# Patient Record
Sex: FEMALE | Race: WHITE | URBAN - METROPOLITAN AREA
[De-identification: names, ages, dates, MRNs, and addresses within clinical notes are randomized per-mention and may not be internally consistent; named-entity substitution may affect disease eponyms.]

---

## 2022-10-17 ENCOUNTER — APPOINTMENT (OUTPATIENT)
Dept: URBAN - METROPOLITAN AREA CLINIC 232 | Age: 77
Setting detail: DERMATOLOGY
End: 2022-10-18

## 2022-10-17 DIAGNOSIS — D18.0 HEMANGIOMA: ICD-10-CM

## 2022-10-17 DIAGNOSIS — L82.0 INFLAMED SEBORRHEIC KERATOSIS: ICD-10-CM

## 2022-10-17 DIAGNOSIS — L82.1 OTHER SEBORRHEIC KERATOSIS: ICD-10-CM

## 2022-10-17 DIAGNOSIS — D22 MELANOCYTIC NEVI: ICD-10-CM

## 2022-10-17 DIAGNOSIS — L81.4 OTHER MELANIN HYPERPIGMENTATION: ICD-10-CM

## 2022-10-17 PROBLEM — D22.5 MELANOCYTIC NEVI OF TRUNK: Status: ACTIVE | Noted: 2022-10-17

## 2022-10-17 PROBLEM — D18.01 HEMANGIOMA OF SKIN AND SUBCUTANEOUS TISSUE: Status: ACTIVE | Noted: 2022-10-17

## 2022-10-17 PROCEDURE — 99203 OFFICE O/P NEW LOW 30 MIN: CPT | Mod: 25

## 2022-10-17 PROCEDURE — OTHER LIQUID NITROGEN: OTHER

## 2022-10-17 PROCEDURE — OTHER MIPS QUALITY: OTHER

## 2022-10-17 PROCEDURE — 17110 DESTRUCT B9 LESION 1-14: CPT

## 2022-10-17 PROCEDURE — OTHER COUNSELING: OTHER

## 2022-10-17 ASSESSMENT — LOCATION DETAILED DESCRIPTION DERM
LOCATION DETAILED: LEFT INFERIOR MEDIAL FOREHEAD
LOCATION DETAILED: LEFT PROXIMAL CALF
LOCATION DETAILED: RIGHT MEDIAL UPPER BACK
LOCATION DETAILED: LEFT CENTRAL PARIETAL SCALP
LOCATION DETAILED: RIGHT SUPERIOR MEDIAL UPPER BACK
LOCATION DETAILED: RIGHT INFERIOR MEDIAL UPPER BACK

## 2022-10-17 ASSESSMENT — LOCATION ZONE DERM
LOCATION ZONE: TRUNK
LOCATION ZONE: SCALP
LOCATION ZONE: LEG
LOCATION ZONE: FACE

## 2022-10-17 ASSESSMENT — LOCATION SIMPLE DESCRIPTION DERM
LOCATION SIMPLE: LEFT CALF
LOCATION SIMPLE: SCALP
LOCATION SIMPLE: RIGHT UPPER BACK
LOCATION SIMPLE: LEFT FOREHEAD

## 2022-10-17 NOTE — PROCEDURE: LIQUID NITROGEN
Spray Paint Technique: No
Duration Of Freeze Thaw-Cycle (Seconds): 10-15
Show Aperture Variable?: Yes
Post-Care Instructions: I reviewed with the patient in detail post-care instructions. Patient is to wear sunprotection, and avoid picking at any of the treated lesions. Pt may apply Vaseline to crusted or scabbing areas.
Medical Necessity Information: It is in your best interest to select a reason for this procedure from the list below. All of these items fulfill various CMS LCD requirements except the new and changing color options.
Number Of Freeze-Thaw Cycles: 2 freeze-thaw cycles
Medical Necessity Clause: This procedure was medically necessary because the lesions that were treated were: irritated
Spray Paint Text: The liquid nitrogen was applied to the skin utilizing a spray paint frosting technique.
Detail Level: Detailed
Consent: The patient's consent was obtained including but not limited to risks of crusting, scabbing, blistering, scarring, darker or lighter pigmentary change, recurrence, incomplete removal and infection.

## 2022-10-17 NOTE — PROCEDURE: MIPS QUALITY
Quality 226: Preventive Care And Screening: Tobacco Use: Screening And Cessation Intervention: Patient screened for tobacco use and is an ex/non-smoker
Detail Level: Detailed
Quality 431: Preventive Care And Screening: Unhealthy Alcohol Use - Screening: Patient not identified as an unhealthy alcohol user when screened for unhealthy alcohol use using a systematic screening method
Quality 110: Preventive Care And Screening: Influenza Immunization: Influenza Immunization not Administered for Documented Reasons.
Quality 111:Pneumonia Vaccination Status For Older Adults: Pneumococcal vaccine (PPSV23) administered on or after patient’s 60th birthday and before the end of the measurement period
Quality 130: Documentation Of Current Medications In The Medical Record: Current Medications Documented

## 2023-02-27 ENCOUNTER — APPOINTMENT (OUTPATIENT)
Dept: URBAN - METROPOLITAN AREA CLINIC 232 | Age: 78
Setting detail: DERMATOLOGY
End: 2023-02-28

## 2023-02-27 DIAGNOSIS — D49.2 NEOPLASM OF UNSPECIFIED BEHAVIOR OF BONE, SOFT TISSUE, AND SKIN: ICD-10-CM

## 2023-02-27 DIAGNOSIS — Z85.828 PERSONAL HISTORY OF OTHER MALIGNANT NEOPLASM OF SKIN: ICD-10-CM

## 2023-02-27 PROCEDURE — OTHER ADDITIONAL NOTES: OTHER

## 2023-02-27 PROCEDURE — 11102 TANGNTL BX SKIN SINGLE LES: CPT

## 2023-02-27 PROCEDURE — 99212 OFFICE O/P EST SF 10 MIN: CPT | Mod: 25

## 2023-02-27 PROCEDURE — OTHER COUNSELING: OTHER

## 2023-02-27 PROCEDURE — OTHER BIOPSY BY SHAVE METHOD: OTHER

## 2023-02-27 ASSESSMENT — LOCATION DETAILED DESCRIPTION DERM
LOCATION DETAILED: RIGHT PROXIMAL PRETIBIAL REGION
LOCATION DETAILED: RIGHT LATERAL DISTAL PRETIBIAL REGION

## 2023-02-27 ASSESSMENT — LOCATION SIMPLE DESCRIPTION DERM: LOCATION SIMPLE: RIGHT PRETIBIAL REGION

## 2023-02-27 ASSESSMENT — LOCATION ZONE DERM: LOCATION ZONE: LEG

## 2023-02-27 NOTE — PROCEDURE: ADDITIONAL NOTES
Detail Level: Zone
Render Risk Assessment In Note?: no
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 No photo or location in patient's previous chart. She is a poor historian and does not remember the exact location.

## 2023-02-27 NOTE — HPI: SKIN LESIONS
How Severe Is Your Skin Lesion?: severe
Have Your Skin Lesions Been Treated?: not been treated
Is This A New Presentation, Or A Follow-Up?: Growth
Additional History: Patient states that PCP removed \"a piece of gravel\" from the lesion.

## 2023-02-27 NOTE — PROCEDURE: BIOPSY BY SHAVE METHOD
Information: Selecting Yes will display possible errors in your note based on the variables you have selected. This validation is only offered as a suggestion for you. PLEASE NOTE THAT THE VALIDATION TEXT WILL BE REMOVED WHEN YOU FINALIZE YOUR NOTE. IF YOU WANT TO FAX A PRELIMINARY NOTE YOU WILL NEED TO TOGGLE THIS TO 'NO' IF YOU DO NOT WANT IT IN YOUR FAXED NOTE.
Hide Accession Number?: No
Post-Care Instructions: I reviewed with the patient in detail post-care instructions. Patient is to keep the biopsy site dry overnight, and then apply bacitracin twice daily until healed. Patient may apply hydrogen peroxide soaks to remove any crusting.
Size Of Lesion In Cm: 0
Anesthesia Type: 1% lidocaine with epinephrine
Billing Type: Third-Party Bill
Electrodesiccation Text: The wound bed was treated with electrodesiccation after the biopsy was performed.
Curettage Text: The wound bed was treated with curettage after the biopsy was performed.
Was A Bandage Applied: Yes
Anesthesia Volume In Cc (Will Not Render If 0): 0.5
Wound Care: Petrolatum
Biopsy Type: H and E
Dressing: pressure dressing
Notification Instructions: Patient will be notified of biopsy results. However, patient instructed to call the office if not contacted within 2 weeks.
Cryotherapy Text: The wound bed was treated with cryotherapy after the biopsy was performed.
Depth Of Biopsy: dermis
Type Of Destruction Used: Curettage
Hemostasis: Drysol
Electrodesiccation And Curettage Text: The wound bed was treated with electrodesiccation and curettage after the biopsy was performed.
Detail Level: Detailed
Biopsy Method: Dermablade
Consent: Written consent was obtained and risks were reviewed including but not limited to scarring, infection, bleeding, scabbing, incomplete removal, nerve damage and allergy to anesthesia.
Silver Nitrate Text: The wound bed was treated with silver nitrate after the biopsy was performed.

## 2023-03-13 ENCOUNTER — APPOINTMENT (OUTPATIENT)
Dept: URBAN - METROPOLITAN AREA CLINIC 232 | Age: 78
Setting detail: DERMATOLOGY
End: 2023-03-14

## 2023-03-13 DIAGNOSIS — Z48.817 ENCOUNTER FOR SURGICAL AFTERCARE FOLLOWING SURGERY ON THE SKIN AND SUBCUTANEOUS TISSUE: ICD-10-CM

## 2023-03-13 PROBLEM — C44.722 SQUAMOUS CELL CARCINOMA OF SKIN OF RIGHT LOWER LIMB, INCLUDING HIP: Status: ACTIVE | Noted: 2023-03-13

## 2023-03-13 PROCEDURE — OTHER DRESSING CHANGE (GLOBAL PERIOD): OTHER

## 2023-03-13 PROCEDURE — OTHER COUNSELING: OTHER

## 2023-03-13 PROCEDURE — OTHER MIPS QUALITY: OTHER

## 2023-03-13 PROCEDURE — OTHER PATHOLOGY DISCUSSION: OTHER

## 2023-03-13 PROCEDURE — 99213 OFFICE O/P EST LOW 20 MIN: CPT

## 2023-03-13 ASSESSMENT — LOCATION DETAILED DESCRIPTION DERM: LOCATION DETAILED: RIGHT DISTAL PRETIBIAL REGION

## 2023-03-13 ASSESSMENT — LOCATION SIMPLE DESCRIPTION DERM: LOCATION SIMPLE: RIGHT PRETIBIAL REGION

## 2023-03-13 ASSESSMENT — LOCATION ZONE DERM: LOCATION ZONE: LEG

## 2023-03-13 NOTE — PROCEDURE: DRESSING CHANGE (GLOBAL PERIOD)
Detail Level: Detailed
Add 96487 Cpt? (Important Note: In 2017 The Use Of 56643 Is Being Tracked By Cms To Determine Future Global Period Reimbursement For Global Periods): no

## 2023-03-13 NOTE — HPI: WOUND CHECK
How Is Your Wound Healing?: healing well
Additional History: Patient states she has been cleaning biopsy site.

## 2023-03-28 ENCOUNTER — APPOINTMENT (OUTPATIENT)
Dept: URBAN - METROPOLITAN AREA CLINIC 239 | Age: 78
Setting detail: DERMATOLOGY
End: 2023-04-02

## 2023-03-28 PROBLEM — C44.722 SQUAMOUS CELL CARCINOMA OF SKIN OF RIGHT LOWER LIMB, INCLUDING HIP: Status: ACTIVE | Noted: 2023-03-28

## 2023-03-28 PROCEDURE — OTHER MOHS SURGERY: OTHER

## 2023-03-28 PROCEDURE — A4550 SURGICAL TRAYS: HCPCS

## 2023-03-28 PROCEDURE — 17313 MOHS 1 STAGE T/A/L: CPT

## 2023-03-28 PROCEDURE — OTHER PHOTO-DOCUMENTATION: OTHER

## 2023-03-28 NOTE — PROCEDURE: PHOTO-DOCUMENTATION
Detail Level: Zone
Photo Preface (Leave Blank If You Do Not Want): Photographs were obtained today
Details (Free Text): 3

## 2023-04-05 ENCOUNTER — RX ONLY (RX ONLY)
Age: 78
End: 2023-04-05

## 2023-04-05 ENCOUNTER — APPOINTMENT (OUTPATIENT)
Dept: URBAN - METROPOLITAN AREA CLINIC 239 | Age: 78
Setting detail: DERMATOLOGY
End: 2023-04-10

## 2023-04-05 DIAGNOSIS — Z48.817 ENCOUNTER FOR SURGICAL AFTERCARE FOLLOWING SURGERY ON THE SKIN AND SUBCUTANEOUS TISSUE: ICD-10-CM

## 2023-04-05 PROCEDURE — 99212 OFFICE O/P EST SF 10 MIN: CPT

## 2023-04-05 PROCEDURE — OTHER PHOTO-DOCUMENTATION: OTHER

## 2023-04-05 PROCEDURE — OTHER POST-OP WOUND CHECK (NO GLOBAL PERIOD): OTHER

## 2023-04-05 PROCEDURE — OTHER PRESCRIPTION: OTHER

## 2023-04-05 RX ORDER — CEPHALEXIN 250 MG/1
CAPSULE ORAL
Qty: 21 | Refills: 0 | COMMUNITY
Start: 2023-04-05

## 2023-04-05 RX ORDER — CEPHALEXIN 250 MG/1
CAPSULE ORAL
Qty: 21 | Refills: 0

## 2023-04-05 ASSESSMENT — LOCATION SIMPLE DESCRIPTION DERM: LOCATION SIMPLE: RIGHT PRETIBIAL REGION

## 2023-04-05 ASSESSMENT — LOCATION ZONE DERM: LOCATION ZONE: LEG

## 2023-04-05 ASSESSMENT — LOCATION DETAILED DESCRIPTION DERM: LOCATION DETAILED: RIGHT DISTAL PRETIBIAL REGION

## 2023-04-05 NOTE — PROCEDURE: POST-OP WOUND CHECK (NO GLOBAL PERIOD)
Detail Level: Detailed
Wound Evaluated By: Dr. Ornelas
Additional Comments: The wound is inflamed and draining serosanguinous fluid. Suspect early wound infection. We will begin Abx regimen. No systemic symptoms.

## 2023-04-18 ENCOUNTER — APPOINTMENT (OUTPATIENT)
Dept: URBAN - METROPOLITAN AREA CLINIC 239 | Age: 78
Setting detail: DERMATOLOGY
End: 2023-04-19

## 2023-04-18 DIAGNOSIS — Z48.817 ENCOUNTER FOR SURGICAL AFTERCARE FOLLOWING SURGERY ON THE SKIN AND SUBCUTANEOUS TISSUE: ICD-10-CM

## 2023-04-18 PROCEDURE — OTHER POST-OP WOUND CHECK: OTHER

## 2023-04-18 PROCEDURE — 99212 OFFICE O/P EST SF 10 MIN: CPT

## 2023-04-18 ASSESSMENT — LOCATION SIMPLE DESCRIPTION DERM: LOCATION SIMPLE: RIGHT PRETIBIAL REGION

## 2023-04-18 ASSESSMENT — LOCATION ZONE DERM: LOCATION ZONE: LEG

## 2023-04-18 ASSESSMENT — LOCATION DETAILED DESCRIPTION DERM: LOCATION DETAILED: RIGHT DISTAL PRETIBIAL REGION

## 2023-04-18 NOTE — PROCEDURE: POST-OP WOUND CHECK
Detail Level: Detailed
Add 49414 Cpt? (Important Note: In 2017 The Use Of 41591 Is Being Tracked By Cms To Determine Future Global Period Reimbursement For Global Periods): yes
Wound Evaluated By: Dr Ornelas
Additional Comments: Approximately 60-70% granulated. Overall healing well. Pt to continue second intent healing. Recheck in 4 weeks.

## 2023-05-19 ENCOUNTER — APPOINTMENT (OUTPATIENT)
Dept: URBAN - METROPOLITAN AREA CLINIC 239 | Age: 78
Setting detail: DERMATOLOGY
End: 2023-05-22

## 2023-05-19 DIAGNOSIS — Z48.817 ENCOUNTER FOR SURGICAL AFTERCARE FOLLOWING SURGERY ON THE SKIN AND SUBCUTANEOUS TISSUE: ICD-10-CM

## 2023-05-19 PROCEDURE — 99212 OFFICE O/P EST SF 10 MIN: CPT

## 2023-05-19 PROCEDURE — OTHER POST-OP WOUND CHECK: OTHER

## 2023-05-19 ASSESSMENT — LOCATION ZONE DERM: LOCATION ZONE: LEG

## 2023-05-19 ASSESSMENT — LOCATION DETAILED DESCRIPTION DERM: LOCATION DETAILED: RIGHT PROXIMAL PRETIBIAL REGION

## 2023-05-19 ASSESSMENT — LOCATION SIMPLE DESCRIPTION DERM: LOCATION SIMPLE: RIGHT PRETIBIAL REGION

## 2023-05-19 NOTE — PROCEDURE: POST-OP WOUND CHECK
Detail Level: Detailed
Add 26050 Cpt? (Important Note: In 2017 The Use Of 63080 Is Being Tracked By Cms To Determine Future Global Period Reimbursement For Global Periods): yes
Wound Evaluated By: Dr Ornelas
Additional Comments: Completely granulated and now re-epithelializing. Overall healing well. Patient will keep bandaged at night or while out. Otherwise, no need to bandage, she will just keep moist until fully healed.

## 2023-06-19 ENCOUNTER — APPOINTMENT (OUTPATIENT)
Dept: URBAN - METROPOLITAN AREA CLINIC 232 | Age: 78
Setting detail: DERMATOLOGY
End: 2023-06-20

## 2023-06-19 DIAGNOSIS — D18.0 HEMANGIOMA: ICD-10-CM

## 2023-06-19 DIAGNOSIS — D22 MELANOCYTIC NEVI: ICD-10-CM

## 2023-06-19 DIAGNOSIS — Z48.817 ENCOUNTER FOR SURGICAL AFTERCARE FOLLOWING SURGERY ON THE SKIN AND SUBCUTANEOUS TISSUE: ICD-10-CM

## 2023-06-19 DIAGNOSIS — L57.0 ACTINIC KERATOSIS: ICD-10-CM

## 2023-06-19 DIAGNOSIS — L82.1 OTHER SEBORRHEIC KERATOSIS: ICD-10-CM

## 2023-06-19 DIAGNOSIS — L81.4 OTHER MELANIN HYPERPIGMENTATION: ICD-10-CM

## 2023-06-19 DIAGNOSIS — L81.7 PIGMENTED PURPURIC DERMATOSIS: ICD-10-CM

## 2023-06-19 PROBLEM — D22.5 MELANOCYTIC NEVI OF TRUNK: Status: ACTIVE | Noted: 2023-06-19

## 2023-06-19 PROBLEM — D18.01 HEMANGIOMA OF SKIN AND SUBCUTANEOUS TISSUE: Status: ACTIVE | Noted: 2023-06-19

## 2023-06-19 PROCEDURE — OTHER MIPS QUALITY: OTHER

## 2023-06-19 PROCEDURE — OTHER LIQUID NITROGEN: OTHER

## 2023-06-19 PROCEDURE — OTHER POST-OP WOUND CHECK: OTHER

## 2023-06-19 PROCEDURE — 17000 DESTRUCT PREMALG LESION: CPT | Mod: 79

## 2023-06-19 PROCEDURE — 99024 POSTOP FOLLOW-UP VISIT: CPT

## 2023-06-19 PROCEDURE — OTHER COUNSELING: OTHER

## 2023-06-19 ASSESSMENT — LOCATION DETAILED DESCRIPTION DERM
LOCATION DETAILED: SUPERIOR MID FOREHEAD
LOCATION DETAILED: LEFT SUPERIOR HELIX
LOCATION DETAILED: RIGHT SUPERIOR UPPER BACK
LOCATION DETAILED: LEFT RIB CAGE
LOCATION DETAILED: LEFT PROXIMAL DORSAL FOREARM
LOCATION DETAILED: RIGHT DISTAL PRETIBIAL REGION
LOCATION DETAILED: RIGHT PROXIMAL PRETIBIAL REGION
LOCATION DETAILED: LEFT PROXIMAL POSTERIOR UPPER ARM

## 2023-06-19 ASSESSMENT — LOCATION SIMPLE DESCRIPTION DERM
LOCATION SIMPLE: SUPERIOR FOREHEAD
LOCATION SIMPLE: LEFT EAR
LOCATION SIMPLE: LEFT POSTERIOR UPPER ARM
LOCATION SIMPLE: ABDOMEN
LOCATION SIMPLE: RIGHT PRETIBIAL REGION
LOCATION SIMPLE: LEFT FOREARM
LOCATION SIMPLE: RIGHT UPPER BACK

## 2023-06-19 ASSESSMENT — LOCATION ZONE DERM
LOCATION ZONE: ARM
LOCATION ZONE: LEG
LOCATION ZONE: FACE
LOCATION ZONE: EAR
LOCATION ZONE: TRUNK

## 2023-06-19 NOTE — PROCEDURE: POST-OP WOUND CHECK
Detail Level: Detailed
Add 81575 Cpt? (Important Note: In 2017 The Use Of 51249 Is Being Tracked By Cms To Determine Future Global Period Reimbursement For Global Periods): yes
Wound Evaluated By: Justyna Viveros,NP

## 2023-06-19 NOTE — PROCEDURE: LIQUID NITROGEN
Show Aperture Variable?: Yes
Consent: The patient's consent was obtained including but not limited to risks of crusting, scabbing, blistering, scarring, darker or lighter pigmentary change, recurrence, incomplete removal and infection.
Render Note In Bullet Format When Appropriate: No
Duration Of Freeze Thaw-Cycle (Seconds): 5
Post-Care Instructions: I reviewed with the patient in detail post-care instructions. Patient is to wear sunprotection, and avoid picking at any of the treated lesions. Pt may apply Vaseline to crusted or scabbing areas.
Detail Level: Detailed
Number Of Freeze-Thaw Cycles: 2 freeze-thaw cycles

## 2023-08-28 ENCOUNTER — APPOINTMENT (OUTPATIENT)
Dept: URBAN - METROPOLITAN AREA CLINIC 232 | Age: 78
Setting detail: DERMATOLOGY
End: 2023-08-29

## 2023-08-28 DIAGNOSIS — L82.0 INFLAMED SEBORRHEIC KERATOSIS: ICD-10-CM

## 2023-08-28 PROCEDURE — OTHER LIQUID NITROGEN: OTHER

## 2023-08-28 PROCEDURE — 17110 DESTRUCT B9 LESION 1-14: CPT

## 2023-08-28 PROCEDURE — OTHER COUNSELING: OTHER

## 2023-08-28 ASSESSMENT — LOCATION SIMPLE DESCRIPTION DERM
LOCATION SIMPLE: CHEST
LOCATION SIMPLE: RIGHT SUBMANDIBULAR AREA

## 2023-08-28 ASSESSMENT — LOCATION DETAILED DESCRIPTION DERM
LOCATION DETAILED: LEFT MEDIAL SUPERIOR CHEST
LOCATION DETAILED: RIGHT SUBMANDIBULAR AREA

## 2023-08-28 ASSESSMENT — LOCATION ZONE DERM
LOCATION ZONE: TRUNK
LOCATION ZONE: FACE

## 2023-08-28 NOTE — PROCEDURE: LIQUID NITROGEN
Consent: The patient's consent was obtained including but not limited to risks of crusting, scabbing, blistering, scarring, darker or lighter pigmentary change, recurrence, incomplete removal and infection.
Medical Necessity Information: It is in your best interest to select a reason for this procedure from the list below. All of these items fulfill various CMS LCD requirements except the new and changing color options.
Show Applicator Variable?: Yes
Add 52 Modifier (Optional): no
Medical Necessity Clause: This procedure was medically necessary because the lesions that were treated were:
Spray Paint Text: The liquid nitrogen was applied to the skin utilizing a spray paint frosting technique.
Detail Level: Detailed
Post-Care Instructions: I reviewed with the patient in detail post-care instructions. Patient is to wear sunprotection, and avoid picking at any of the treated lesions. Pt may apply Vaseline to crusted or scabbing areas.

## 2024-02-08 ENCOUNTER — APPOINTMENT (OUTPATIENT)
Dept: URBAN - METROPOLITAN AREA CLINIC 232 | Age: 79
Setting detail: DERMATOLOGY
End: 2024-02-08

## 2024-02-08 DIAGNOSIS — D69.2 OTHER NONTHROMBOCYTOPENIC PURPURA: ICD-10-CM

## 2024-02-08 DIAGNOSIS — L57.0 ACTINIC KERATOSIS: ICD-10-CM

## 2024-02-08 PROCEDURE — 17000 DESTRUCT PREMALG LESION: CPT

## 2024-02-08 PROCEDURE — OTHER COUNSELING: OTHER

## 2024-02-08 PROCEDURE — OTHER LIQUID NITROGEN: OTHER

## 2024-02-08 PROCEDURE — OTHER MIPS QUALITY: OTHER

## 2024-02-08 PROCEDURE — 99212 OFFICE O/P EST SF 10 MIN: CPT | Mod: 25

## 2024-02-08 ASSESSMENT — LOCATION DETAILED DESCRIPTION DERM
LOCATION DETAILED: LEFT PROXIMAL PRETIBIAL REGION
LOCATION DETAILED: LEFT DISTAL RADIAL DORSAL FOREARM
LOCATION DETAILED: NASAL DORSUM
LOCATION DETAILED: RIGHT PROXIMAL DORSAL FOREARM
LOCATION DETAILED: RIGHT PROXIMAL PRETIBIAL REGION

## 2024-02-08 ASSESSMENT — LOCATION SIMPLE DESCRIPTION DERM
LOCATION SIMPLE: LEFT FOREARM
LOCATION SIMPLE: NOSE
LOCATION SIMPLE: LEFT PRETIBIAL REGION
LOCATION SIMPLE: RIGHT FOREARM
LOCATION SIMPLE: RIGHT PRETIBIAL REGION

## 2024-02-08 ASSESSMENT — LOCATION ZONE DERM
LOCATION ZONE: LEG
LOCATION ZONE: ARM
LOCATION ZONE: NOSE

## 2024-02-08 NOTE — PROCEDURE: LIQUID NITROGEN
Application Tool (Optional): Cry-AC
Show Aperture Variable?: Yes
Duration Of Freeze Thaw-Cycle (Seconds): 15
Render Note In Bullet Format When Appropriate: No
Number Of Freeze-Thaw Cycles: 2 freeze-thaw cycles
Detail Level: Detailed
Post-Care Instructions: I reviewed with the patient in detail post-care instructions. Patient is to wear sunprotection, and avoid picking at any of the treated lesions. Pt may apply Vaseline to crusted or scabbing areas.
Consent: The patient's consent was obtained including but not limited to risks of crusting, scabbing, blistering, scarring, darker or lighter pigmentary change, recurrence, incomplete removal and infection.

## 2024-10-25 ENCOUNTER — APPOINTMENT (OUTPATIENT)
Dept: URBAN - METROPOLITAN AREA CLINIC 232 | Age: 79
Setting detail: DERMATOLOGY
End: 2024-10-25

## 2024-10-25 DIAGNOSIS — D18.0 HEMANGIOMA: ICD-10-CM

## 2024-10-25 DIAGNOSIS — B07.0 PLANTAR WART: ICD-10-CM

## 2024-10-25 DIAGNOSIS — L82.1 OTHER SEBORRHEIC KERATOSIS: ICD-10-CM

## 2024-10-25 DIAGNOSIS — L81.4 OTHER MELANIN HYPERPIGMENTATION: ICD-10-CM

## 2024-10-25 DIAGNOSIS — D22 MELANOCYTIC NEVI: ICD-10-CM

## 2024-10-25 DIAGNOSIS — Z71.89 OTHER SPECIFIED COUNSELING: ICD-10-CM

## 2024-10-25 PROBLEM — D18.01 HEMANGIOMA OF SKIN AND SUBCUTANEOUS TISSUE: Status: ACTIVE | Noted: 2024-10-25

## 2024-10-25 PROBLEM — D22.5 MELANOCYTIC NEVI OF TRUNK: Status: ACTIVE | Noted: 2024-10-25

## 2024-10-25 PROCEDURE — 99213 OFFICE O/P EST LOW 20 MIN: CPT

## 2024-10-25 PROCEDURE — OTHER COUNSELING: OTHER

## 2024-10-25 PROCEDURE — OTHER MIPS QUALITY: OTHER

## 2024-10-25 PROCEDURE — OTHER TREATMENT REGIMEN: OTHER

## 2024-10-25 ASSESSMENT — LOCATION DETAILED DESCRIPTION DERM
LOCATION DETAILED: STERNUM
LOCATION DETAILED: SUPERIOR THORACIC SPINE
LOCATION DETAILED: LEFT ACHILLES SKIN

## 2024-10-25 ASSESSMENT — LOCATION ZONE DERM
LOCATION ZONE: LEG
LOCATION ZONE: TRUNK

## 2024-10-25 ASSESSMENT — LOCATION SIMPLE DESCRIPTION DERM
LOCATION SIMPLE: CHEST
LOCATION SIMPLE: UPPER BACK
LOCATION SIMPLE: LEFT ACHILLES SKIN

## 2024-10-25 NOTE — PROCEDURE: TREATMENT REGIMEN
Detail Level: Zone
Otc Regimen: Wart stick or other Salicylic acid topical treatments, patient has very thin skin and foot is unlikely to heal well with traumatic treatments.

## 2025-03-18 ENCOUNTER — APPOINTMENT (OUTPATIENT)
Dept: URBAN - METROPOLITAN AREA CLINIC 232 | Age: 80
Setting detail: DERMATOLOGY
End: 2025-03-18

## 2025-03-18 DIAGNOSIS — L81.4 OTHER MELANIN HYPERPIGMENTATION: ICD-10-CM

## 2025-03-18 DIAGNOSIS — D18.0 HEMANGIOMA: ICD-10-CM

## 2025-03-18 DIAGNOSIS — Z71.89 OTHER SPECIFIED COUNSELING: ICD-10-CM

## 2025-03-18 DIAGNOSIS — D22 MELANOCYTIC NEVI: ICD-10-CM

## 2025-03-18 DIAGNOSIS — L82.1 OTHER SEBORRHEIC KERATOSIS: ICD-10-CM

## 2025-03-18 PROBLEM — D22.5 MELANOCYTIC NEVI OF TRUNK: Status: ACTIVE | Noted: 2025-03-18

## 2025-03-18 PROBLEM — D18.01 HEMANGIOMA OF SKIN AND SUBCUTANEOUS TISSUE: Status: ACTIVE | Noted: 2025-03-18

## 2025-03-18 PROCEDURE — OTHER COUNSELING: OTHER

## 2025-03-18 PROCEDURE — 99213 OFFICE O/P EST LOW 20 MIN: CPT

## 2025-03-18 PROCEDURE — OTHER MIPS QUALITY: OTHER

## 2025-03-18 ASSESSMENT — LOCATION ZONE DERM: LOCATION ZONE: TRUNK

## 2025-03-18 ASSESSMENT — LOCATION SIMPLE DESCRIPTION DERM
LOCATION SIMPLE: UPPER BACK
LOCATION SIMPLE: CHEST

## 2025-03-18 ASSESSMENT — LOCATION DETAILED DESCRIPTION DERM
LOCATION DETAILED: SUPERIOR THORACIC SPINE
LOCATION DETAILED: STERNUM